# Patient Record
Sex: MALE | Race: BLACK OR AFRICAN AMERICAN | Employment: FULL TIME
[De-identification: names, ages, dates, MRNs, and addresses within clinical notes are randomized per-mention and may not be internally consistent; named-entity substitution may affect disease eponyms.]

---

## 2023-12-06 ENCOUNTER — HOSPITAL ENCOUNTER (EMERGENCY)
Facility: HOSPITAL | Age: 53
Discharge: HOME | End: 2023-12-06
Attending: EMERGENCY MEDICINE
Payer: COMMERCIAL

## 2023-12-06 ENCOUNTER — APPOINTMENT (OUTPATIENT)
Facility: HOSPITAL | Age: 53
End: 2023-12-06
Payer: COMMERCIAL

## 2023-12-06 VITALS
RESPIRATION RATE: 16 BRPM | WEIGHT: 250 LBS | HEIGHT: 72 IN | OXYGEN SATURATION: 97 % | BODY MASS INDEX: 33.86 KG/M2 | SYSTOLIC BLOOD PRESSURE: 129 MMHG | HEART RATE: 73 BPM | DIASTOLIC BLOOD PRESSURE: 79 MMHG | TEMPERATURE: 97.6 F

## 2023-12-06 DIAGNOSIS — B34.9 VIRAL SYNDROME: Primary | ICD-10-CM

## 2023-12-06 PROCEDURE — 71045 X-RAY EXAM CHEST 1 VIEW: CPT

## 2023-12-06 PROCEDURE — 99283 EMERGENCY DEPT VISIT LOW MDM: CPT

## 2023-12-06 RX ORDER — DEXTROMETHORPHAN HYDROBROMIDE AND PROMETHAZINE HYDROCHLORIDE 15; 6.25 MG/5ML; MG/5ML
5 SYRUP ORAL EVERY 8 HOURS PRN
Qty: 60 ML | Refills: 0 | Status: SHIPPED | OUTPATIENT
Start: 2023-12-06 | End: 2023-12-16

## 2023-12-06 ASSESSMENT — PAIN - FUNCTIONAL ASSESSMENT: PAIN_FUNCTIONAL_ASSESSMENT: 0-10

## 2023-12-06 NOTE — ED TRIAGE NOTES
Pt ambulatory to room; c/o flu like sx x 2 days; pt sent home fropm work yesterday due to fever 101. Pt reports productive cough at times ( has pain only when he coughs 7/10; no pain at this time)    clear,. Clear nasal drainage, aches, headaches, sore throat, chills watery stools ( all the time leaking ) pt denied pain at this time.

## 2023-12-06 NOTE — ED NOTES
Pt discharged to home at this time. All discharge instructions provided to patient. All questions answered. No distress noted at time of discharge.       José Juarez RN  12/06/23 [id number]

## 2023-12-06 NOTE — DISCHARGE INSTRUCTIONS
Routine appointments for health maintenance with a primary care provider are very important and emergency department visits are no substitute. You should review all findings and test results from your visit today with your primary care physician. We recommended that you take medications as prescribed. You may take 1 or 2 pills of Tylenol every 6 hours as needed for pain or fever. You should not take this medication with other medications that contain acetaminophen/Tylenol which could include prescription pain medications or other combo over-the-counter medications. You should not exceed more than 4000 mg in a 24 hour. You may use 800 mg of ibuprofen every 6 hours as needed for pain or fever. You should NOT take this medication if you're taking other NSAID/anti-inflammatory medications or on steroids or other blood thinning medications. You may take tylenol and ibuprofen alternating every 6 hours as needed to control fevers/pain. Drink lots of water. You may use over the counter mucinex to help break of phlegm. Return to the emergency department for any new or concerning signs/symptoms or failure to improve.